# Patient Record
Sex: MALE | Race: WHITE
[De-identification: names, ages, dates, MRNs, and addresses within clinical notes are randomized per-mention and may not be internally consistent; named-entity substitution may affect disease eponyms.]

---

## 2017-09-30 ENCOUNTER — HOSPITAL ENCOUNTER (EMERGENCY)
Dept: HOSPITAL 43 - DL.ED | Age: 52
Discharge: HOME | End: 2017-09-30
Payer: COMMERCIAL

## 2017-09-30 VITALS — DIASTOLIC BLOOD PRESSURE: 76 MMHG | SYSTOLIC BLOOD PRESSURE: 101 MMHG

## 2017-09-30 DIAGNOSIS — E11.9: ICD-10-CM

## 2017-09-30 DIAGNOSIS — Z88.7: ICD-10-CM
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
## 2017-09-30 DIAGNOSIS — Z79.899: ICD-10-CM

## 2017-09-30 DIAGNOSIS — Z88.0: ICD-10-CM

## 2017-09-30 DIAGNOSIS — S86.911A: Primary | ICD-10-CM

## 2017-09-30 DIAGNOSIS — Z79.82: ICD-10-CM
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
## 2017-09-30 DIAGNOSIS — Z79.84: ICD-10-CM

## 2017-09-30 PROCEDURE — 99284 EMERGENCY DEPT VISIT MOD MDM: CPT

## 2017-09-30 PROCEDURE — 73590 X-RAY EXAM OF LOWER LEG: CPT

## 2017-09-30 NOTE — EDM.PDOC
ED HPI GENERAL MEDICAL PROBLEM





- General


Chief Complaint: Lower Extremity Injury/Pain


Stated Complaint: LEG PAIN [id number]


Time Seen by Provider: 09/30/17 11:21


Source of Information: Reports: Patient


History Limitations: Reports: No Limitations





- History of Present Illness


INITIAL COMMENTS - FREE TEXT/NARRATIVE: 





Patient comes emergency department today complaining of right posterior calf 

pain. Just prior to arrival the patient was working outside when a log was 

coming towards him and he jumped backwards landing on his feet and developed 

right posterior calf pain with ambulation. He denies any lung force trauma to 

his right lower extremity. He denies any hip femur knee ankle or right foot 

pain. He does have pain with ambulation in the posterior aspect of his calf. He 

has not taken anything for pain prior to arrival. A sharp shooting stabbing in 

nature. Ice is applied upon arrival.


  ** Right Lower Leg


Pain Score (Numeric/FACES): 4





- Related Data


 Allergies











Allergy/AdvReac Type Severity Reaction Status Date / Time


 


Penicillins Allergy  Cannot Verified 09/30/17 10:27





   Remember  


 


tetanus toxoid, adsorbed Allergy  Cannot Verified 09/30/17 10:27





   Remember  











Home Meds: 


 Home Meds





Aspirin [Ecotrin] 81 mg PO DAILY 03/14/14 [History]


DULoxetine [Cymbalta] 60 mg PO DAILY 03/14/14 [History]


Enalapril [Vasotec] 2.5 mg PO DAILY 03/14/14 [History]


Fish Oil/Omega-3 Fatty Acids [Fish Oil] 1 gm PO BID 03/14/14 [History]


Gabapentin [Gabapentin] 300 mg PO QID 03/14/14 [History]


Simvastatin [Zocor] 40 mg PO BEDTIME 03/14/14 [History]


metFORMIN [metFORMIN XR] 1,000 mg PO BIDM 03/14/14 [History]


traMADol [Ultram] 50 mg PO Q6HR PRN 03/14/14 [History]


Meloxicam [Mobic] 15 mg PO DAILY 10/18/16 [History]


Cyclobenzaprine [Flexeril] 10 mg PO BEDTIME 09/30/17 [History]











Past Medical History


Musculoskeletal History: Reports: Back Pain, Chronic


Endocrine/Metabolic History: Reports: Diabetes, Type II





Social & Family History





- Family History


Family Medical History: Noncontributory





- Tobacco Use


Smoking Status *Q: Never Smoker


Second Hand Smoke Exposure: No





- Caffeine Use


Caffeine Use: Reports: None





- Recreational Drug Use


Recreational Drug Use: No





Review of Systems





- Review of Systems


Review Of Systems: ROS reveals no pertinent complaints other than HPI.





ED EXAM, GENERAL





- Physical Exam


Exam: See Below


Exam Limited By: No Limitations


General Appearance: Alert, WD/WN, No Apparent Distress


Extremities: Normal Inspection, No Pedal Edema, Normal Capillary Refill, Leg 

Pain (Posterior calf tenderness about mid calf. There is no bruising swelling 

and ecchymosis lumps bumps or other abnormalities of the calf. Achilles tendon 

is unremarkable. CMS is intact throughout the entirety of the right lower 

extremity. No bony deformity to the area of the right tib-fib region. He does 

have tenderness in the calf with dorsiflexion and plantar flexion of the right 

foot. Right ankle right knee right femur right hip are atraumatic and 

unremarkable.).  No: Joint Swelling


Neurological: Alert, Oriented, CN II-XII Intact


Psychiatric: Normal Affect, Normal Mood


Skin Exam: Warm, Dry, Intact, Normal Color


Lymphatic: No Adenopathy





Course





- Vital Signs


Last Recorded V/S: 


 Last Vital Signs











Temp  35.9 C   09/30/17 10:37


 


Pulse  83   09/30/17 11:02


 


Resp  16   09/30/17 11:02


 


BP  101/76   09/30/17 11:02


 


Pulse Ox  96   09/30/17 11:02














- Orders/Labs/Meds


Orders: 


 Active Orders 24 hr











 Category Date Time Status


 


 Tibia Fibula Rt [CR] Urgent Exams  09/30/17 11:19 Taken











Meds: 


Medications














Discontinued Medications














Generic Name Dose Route Start Last Admin





  Trade Name Rosemarie  PRN Reason Stop Dose Admin


 


Ibuprofen  600 mg  09/30/17 11:19  09/30/17 11:32





  Motrin  PO  09/30/17 11:20  600 mg





  ONETIME ONE   Administration














- Radiology Interpretation


Free Text/Narrative:: 





NO acute fracture per radiology. 





- Re-Assessments/Exams


Free Text/Narrative Re-Assessment/Exam: 





09/30/17 12:21


I reviewed the negative x-rays with the patient. His pain was somewhat improved 

with the ibuprofen. I feel that this is most likely a muscle strain at this 

time. We did put a 4 inch Ace wrap with almost complete resolution of pain in 

his right calf. We'll treat him conservatively at this time. He was comfortable 

with this plan and his questions were answered.





Departure





- Departure


Time of Disposition: 12:13


Disposition: Home, Self-Care 01


Clinical Impression: 


 Muscle strain








- Discharge Information


Instructions:  Muscle Strain, Easy-to-Read


Forms:  ED Department Discharge


Additional Instructions: 


Tylenol and/or ibuprofen for pain. Ibuprofen will most likely be more effective.


Rest ice compression and elevation of the right injured extremity until symptom-

free. 


Ace wrap as needed for comfort.


May use your Flexeril at home for muscle spasms as well.


Return to the emergency department if new or worsening symptoms.


Follow-up with primary care provider the next 7 days if not improving sooner if 

worse.





- My Orders


Last 24 Hours: 


My Active Orders





09/30/17 11:19


Tibia Fibula Rt [CR] Urgent 














- Assessment/Plan


Last 24 Hours: 


My Active Orders





09/30/17 11:19


Tibia Fibula Rt [CR] Urgent 











Assessment:: 





Left calf muscle strain


Plan: 





Tylenol and/or ibuprofen for pain. Ibuprofen will most likely be more effective.


Rest ice compression and elevation of the right injured extremity until symptom-

free. 


Ace wrap as needed for comfort.


May use your Flexeril at home for muscle spasms as well.


Return to the emergency department if new or worsening symptoms.


Follow-up with primary care provider the next 7 days if not improving sooner if 

worse.

## 2018-04-23 ENCOUNTER — HOSPITAL ENCOUNTER (EMERGENCY)
Dept: HOSPITAL 43 - DL.ED | Age: 53
LOS: 1 days | Discharge: HOME | End: 2018-04-24
Payer: COMMERCIAL

## 2018-04-23 DIAGNOSIS — E86.0: ICD-10-CM

## 2018-04-23 DIAGNOSIS — Z79.82: ICD-10-CM

## 2018-04-23 DIAGNOSIS — Z79.84: ICD-10-CM

## 2018-04-23 DIAGNOSIS — Z88.0: ICD-10-CM

## 2018-04-23 DIAGNOSIS — Z79.899: ICD-10-CM

## 2018-04-23 DIAGNOSIS — I10: ICD-10-CM

## 2018-04-23 DIAGNOSIS — E11.9: ICD-10-CM

## 2018-04-23 DIAGNOSIS — Z88.7: ICD-10-CM

## 2018-04-23 DIAGNOSIS — K52.9: Primary | ICD-10-CM

## 2018-04-23 DIAGNOSIS — E78.00: ICD-10-CM

## 2018-04-23 LAB
CHLORIDE SERPL-SCNC: 101 MMOL/L (ref 101–111)
SODIUM SERPL-SCNC: 133 MMOL/L (ref 135–145)

## 2018-04-23 PROCEDURE — 87804 INFLUENZA ASSAY W/OPTIC: CPT

## 2018-04-23 PROCEDURE — 82150 ASSAY OF AMYLASE: CPT

## 2018-04-23 PROCEDURE — 81001 URINALYSIS AUTO W/SCOPE: CPT

## 2018-04-23 PROCEDURE — 96361 HYDRATE IV INFUSION ADD-ON: CPT

## 2018-04-23 PROCEDURE — 96374 THER/PROPH/DIAG INJ IV PUSH: CPT

## 2018-04-23 PROCEDURE — 87899 AGENT NOS ASSAY W/OPTIC: CPT

## 2018-04-23 PROCEDURE — 83605 ASSAY OF LACTIC ACID: CPT

## 2018-04-23 PROCEDURE — 85025 COMPLETE CBC W/AUTO DIFF WBC: CPT

## 2018-04-23 PROCEDURE — 99284 EMERGENCY DEPT VISIT MOD MDM: CPT

## 2018-04-23 PROCEDURE — 80053 COMPREHEN METABOLIC PANEL: CPT

## 2018-04-23 PROCEDURE — 83690 ASSAY OF LIPASE: CPT

## 2018-04-23 PROCEDURE — 87040 BLOOD CULTURE FOR BACTERIA: CPT

## 2018-04-23 PROCEDURE — 36415 COLL VENOUS BLD VENIPUNCTURE: CPT

## 2018-04-23 PROCEDURE — 87046 STOOL CULTR AEROBIC BACT EA: CPT

## 2018-04-23 PROCEDURE — 96375 TX/PRO/DX INJ NEW DRUG ADDON: CPT

## 2018-04-23 PROCEDURE — 87045 FECES CULTURE AEROBIC BACT: CPT

## 2018-04-23 NOTE — EDM.PDOC
ED HPI GENERAL MEDICAL PROBLEM





- General


Chief Complaint: Gastrointestinal Problem


Stated Complaint: [id number] FLU?


Time Seen by Provider: 04/23/18 19:30


Source of Information: Reports: Patient


History Limitations: Reports: No Limitations





- History of Present Illness


INITIAL COMMENTS - FREE TEXT/NARRATIVE: 





diarrhea since last april, 20 plus stools today. dizzy when up


  ** Abdomen


Pain Score (Numeric/FACES): 8





- Related Data


 Allergies











Allergy/AdvReac Type Severity Reaction Status Date / Time


 


Penicillins Allergy  Cannot Verified 09/30/17 10:27





   Remember  


 


tetanus toxoid, adsorbed Allergy  Cannot Verified 09/30/17 10:27





   Remember  











Home Meds: 


 Home Meds





Aspirin [Ecotrin] 81 mg PO DAILY 03/14/14 [History]


DULoxetine [Cymbalta] 60 mg PO DAILY 03/14/14 [History]


Enalapril [Vasotec] 2.5 mg PO DAILY 03/14/14 [History]


Fish Oil/Omega-3 Fatty Acids [Fish Oil] 1 gm PO BID 03/14/14 [History]


Gabapentin 300 mg PO QID 03/14/14 [History]


Simvastatin [Zocor] 40 mg PO BEDTIME 03/14/14 [History]


metFORMIN [metFORMIN XR] 1,000 mg PO BIDM 03/14/14 [History]


traMADol [Ultram] 50 mg PO Q6HR PRN 03/14/14 [History]


Meloxicam [Mobic] 15 mg PO DAILY 10/18/16 [History]


Cyclobenzaprine [Flexeril] 10 mg PO BEDTIME 09/30/17 [History]











Past Medical History


Cardiovascular History: Reports: High Cholesterol, Hypertension


Musculoskeletal History: Reports: Back Pain, Chronic


Psychiatric History: Reports: Anxiety, Depression


Endocrine/Metabolic History: Reports: Diabetes, Type II





Social & Family History





- Family History


Family Medical History: Noncontributory





- Tobacco Use


Smoking Status *Q: Never Smoker


Second Hand Smoke Exposure: No





- Caffeine Use


Caffeine Use: Reports: Soda





- Recreational Drug Use


Recreational Drug Use: No





ED ROS GENERAL





- Review of Systems


Review Of Systems: See Below


Constitutional: Reports: Fever, Chills, Malaise, Weakness, Decreased Appetite


HEENT: Reports: No Symptoms


Respiratory: Reports: No Symptoms


Cardiovascular: Reports: Lightheadedness


GI/Abdominal: Reports: Abdominal Pain, Diarrhea, Decreased Appetite


Musculoskeletal: Reports: No Symptoms


Skin: Reports: No Symptoms


Neurological: Reports: Headache





ED EXAM, GI/ABD





- Physical Exam


Exam: See Below


Exam Limited By: No Limitations


General Appearance: Alert, No Apparent Distress


Eyes: Bilateral: EOMI


Ears: Normal External Exam


Nose: Normal Inspection


Throat/Mouth: Normal Lips, Other (membranes parched)


Head: Atraumatic, Normocephalic


Neck: Normal Inspection


Respiratory/Chest: No Respiratory Distress, Lungs Clear, Normal Breath Sounds


Cardiovascular: Normal Peripheral Pulses, Regular Rate, Rhythm


GI/Abdominal Exam: Normal Bowel Sounds, Soft, Tender, Abnormal Bowel Sounds (

hyperactive)


Back Exam: Normal Inspection


Extremities: Normal Inspection


Neurological: Alert, Oriented, Normal Cognition


Psychiatric: Normal Affect, Normal Mood


Skin Exam: Warm, Dry, Intact, Normal Color





Course





- Vital Signs


Last Recorded V/S: 


 Last Vital Signs











Temp  101.0 F H  04/24/18 01:36


 


Pulse  98   04/24/18 01:36


 


Resp  19   04/24/18 01:36


 


BP  112/71   04/24/18 01:36


 


Pulse Ox  97   04/24/18 01:36














- Orders/Labs/Meds


Orders: 


 Active Orders 24 hr











 Category Date Time Status


 


 CULTURE BLOOD [BC] Stat Lab  04/23/18 20:30 Received


 


 CULTURE STOOL [RM] Stat Lab  04/24/18 00:27 Ordered


 


 SHIGA TOXIN 1 & 2 [MREF] Stat Lab  04/24/18 00:27 Received











Labs: 


 Laboratory Tests











  04/23/18 04/23/18 04/23/18 Range/Units





  20:30 20:30 20:30 


 


WBC  12.7 H    (5.0-10.0)  10^3/uL


 


RBC  4.14 L    (4.6-6.2)  10^6/uL


 


Hgb  14.2    (14.0-18.0)  g/dL


 


Hct  40.6    (40.0-54.0)  %


 


MCV  98.1    ()  fL


 


MCH  34.3 H    (27.0-34.0)  pg


 


MCHC  35.0    (33.0-35.0)  g/dL


 


Plt Count  156    (150-450)  10^3/uL


 


Neut % (Auto)  89.8 H    (42.2-75.2)  %


 


Lymph % (Auto)  2.7 L    (20.5-50.1)  %


 


Mono % (Auto)  7.2    (2-8)  %


 


Eos % (Auto)  0.1 L    (1.0-3.0)  %


 


Baso % (Auto)  0.2    (0.0-1.0)  %


 


Sodium   133 L   (135-145)  mmol/L


 


Potassium   3.4 L   (3.6-5.0)  mmol/L


 


Chloride   101   (101-111)  mmol/L


 


Carbon Dioxide   21.0   (21.0-31.0)  mmol/L


 


Anion Gap   14.4   


 


BUN   14   (7-18)  mg/dL


 


Creatinine   0.9   (0.6-1.3)  mg/dL


 


Est Cr Clr Drug Dosing   99.94   mL/min


 


Estimated GFR (MDRD)   > 60   


 


BUN/Creatinine Ratio   15.55   


 


Glucose   193 H   ()  mg/dL


 


Lactic Acid    2.5 H  (0.5-2.2)  mmol/L


 


Calcium   9.0   (8.4-10.2)  mg/dl


 


Total Bilirubin   2.8 H   (0.2-1.0)  mg/dL


 


AST   38   (10-42)  IU/L


 


ALT   35   (10-60)  IU/L


 


Alkaline Phosphatase   68   ()  IU/L


 


Total Protein   7.6   (6.7-8.2)  g/dl


 


Albumin   4.5   (3.2-5.5)  g/dl


 


Globulin   3.1   


 


Albumin/Globulin Ratio   1.45   


 


Amylase   23 L   ()  U/L


 


Lipase   24   (22-51)  U/L


 


Urine Color     (YELLOW)  


 


Urine Appearance     (CLEAR)  


 


Urine pH     (5.0-9.0)  


 


Ur Specific Gravity     (1.005-1.030)  


 


Urine Protein     (NEGATIVE)  


 


Urine Glucose (UA)     (NEGATIVE)  


 


Urine Ketones     (NEGATIVE)  


 


Urine Occult Blood     (NEGATIVE)  


 


Urine Nitrite     (NEGATIVE)  


 


Urine Bilirubin     (NEGATIVE)  


 


Urine Urobilinogen     (0.2-1.0)  mg/dL


 


Ur Leukocyte Esterase     (NEGATIVE)  


 


Urine RBC     /HPF


 


Urine WBC     (0-5/HPF)  /HPF


 


Ur Epithelial Cells     /HPF


 


Urine Bacteria     (0-FEW/HPF)  /HPF


 


Urine Mucus     /LPF














  04/24/18 Range/Units





  00:27 


 


WBC   (5.0-10.0)  10^3/uL


 


RBC   (4.6-6.2)  10^6/uL


 


Hgb   (14.0-18.0)  g/dL


 


Hct   (40.0-54.0)  %


 


MCV   ()  fL


 


MCH   (27.0-34.0)  pg


 


MCHC   (33.0-35.0)  g/dL


 


Plt Count   (150-450)  10^3/uL


 


Neut % (Auto)   (42.2-75.2)  %


 


Lymph % (Auto)   (20.5-50.1)  %


 


Mono % (Auto)   (2-8)  %


 


Eos % (Auto)   (1.0-3.0)  %


 


Baso % (Auto)   (0.0-1.0)  %


 


Sodium   (135-145)  mmol/L


 


Potassium   (3.6-5.0)  mmol/L


 


Chloride   (101-111)  mmol/L


 


Carbon Dioxide   (21.0-31.0)  mmol/L


 


Anion Gap   


 


BUN   (7-18)  mg/dL


 


Creatinine   (0.6-1.3)  mg/dL


 


Est Cr Clr Drug Dosing   mL/min


 


Estimated GFR (MDRD)   


 


BUN/Creatinine Ratio   


 


Glucose   ()  mg/dL


 


Lactic Acid   (0.5-2.2)  mmol/L


 


Calcium   (8.4-10.2)  mg/dl


 


Total Bilirubin   (0.2-1.0)  mg/dL


 


AST   (10-42)  IU/L


 


ALT   (10-60)  IU/L


 


Alkaline Phosphatase   ()  IU/L


 


Total Protein   (6.7-8.2)  g/dl


 


Albumin   (3.2-5.5)  g/dl


 


Globulin   


 


Albumin/Globulin Ratio   


 


Amylase   ()  U/L


 


Lipase   (22-51)  U/L


 


Urine Color  Yellow  (YELLOW)  


 


Urine Appearance  Clear  (CLEAR)  


 


Urine pH  5.0  (5.0-9.0)  


 


Ur Specific Gravity  >= 1.030  (1.005-1.030)  


 


Urine Protein  Trace H  (NEGATIVE)  


 


Urine Glucose (UA)  Negative  (NEGATIVE)  


 


Urine Ketones  15 H  (NEGATIVE)  


 


Urine Occult Blood  Negative  (NEGATIVE)  


 


Urine Nitrite  Negative  (NEGATIVE)  


 


Urine Bilirubin  Negative  (NEGATIVE)  


 


Urine Urobilinogen  0.2  (0.2-1.0)  mg/dL


 


Ur Leukocyte Esterase  Negative  (NEGATIVE)  


 


Urine RBC  0-5  /HPF


 


Urine WBC  0-5  (0-5/HPF)  /HPF


 


Ur Epithelial Cells  Occasional  /HPF


 


Urine Bacteria  Few  (0-FEW/HPF)  /HPF


 


Urine Mucus  Moderate H  /LPF











Meds: 


Medications














Discontinued Medications














Generic Name Dose Route Start Last Admin





  Trade Name Rosemarie  PRN Reason Stop Dose Admin


 


Acetaminophen  650 mg  04/23/18 21:43  04/23/18 21:53





  Tylenol  PO  04/23/18 21:44  650 mg





  NOW ONE   Administration





     





     





     





     


 


Famotidine  20 mg  04/23/18 20:57  04/23/18 21:06





  Pepcid  IVPUSH  04/23/18 20:58  20 mg





  ONETIME ONE   Administration





     





     





     





     


 


Sodium Chloride  1,000 mls @ 999 mls/hr  04/23/18 20:45  04/23/18 20:35





  Normal Saline  IV   999 mls/hr





  ASDIRECTED REECE   Administration





     





     





     





     


 


Sodium Chloride  1,000 mls @ 999 mls/hr  04/23/18 21:53  04/23/18 21:57





  Normal Saline  IV  04/23/18 22:53  999 mls/hr





  .BOLUS ONE   Administration





     





     





     





     


 


Ibuprofen  600 mg  04/23/18 23:29  04/23/18 23:36





  Motrin  PO  04/23/18 23:30  600 mg





  ONETIME ONE   Administration





     





     





     





     


 


Ondansetron HCl  4 mg  04/23/18 21:05  04/23/18 21:08





  Zofran  IV  04/23/18 21:06  4 mg





  ONETIME ONE   Administration





     





     





     





     


 


Ondansetron HCl  Confirm  04/24/18 01:42  04/24/18 01:48





  Zofran Odt  Administered  04/24/18 01:43  Not Given





  Dose   





  8 mg   





  .ROUTE   





  .STK-MED ONE   





     





     





     





     














- Re-Assessments/Exams


Free Text/Narrative Re-Assessment/Exam: 





04/24/18 03:00


Fever controlled with use of tylenol and ibuprofen.Abodominal pain improved, 

headache improved. One stool during ED. tolerating few sips liquid. 





Departure





- Departure


Time of Disposition: 00:55


Disposition: Home, Self-Care 01


Condition: Good


Clinical Impression: 


 Gastroenteritis, Diarrhea, Dehydration, mild








- Discharge Information


Instructions:  Diarrhea, Adult, Dehydration, Adult, Easy-to-Read


Forms:  ED Department Discharge


Additional Instructions: 


clear liquid diet small amounts more frequent, slowly advance as tolerated


zofran 4mg ODT one every 6 hours as needed for nausea #2





follow up if symptoms worsen





- My Orders


Last 24 Hours: 


My Active Orders





04/23/18 20:30


CULTURE BLOOD [BC] Stat 





04/24/18 00:27


CULTURE STOOL [RM] Stat 


SHIGA TOXIN 1 & 2 [MREF] Stat 














- Assessment/Plan


Last 24 Hours: 


My Active Orders





04/23/18 20:30


CULTURE BLOOD [BC] Stat 





04/24/18 00:27


CULTURE STOOL [RM] Stat 


SHIGA TOXIN 1 & 2 [MREF] Stat

## 2018-04-24 VITALS — SYSTOLIC BLOOD PRESSURE: 112 MMHG | DIASTOLIC BLOOD PRESSURE: 71 MMHG

## 2019-06-06 ENCOUNTER — HOSPITAL ENCOUNTER (OUTPATIENT)
Dept: HOSPITAL 43 - DL.MRI | Age: 54
End: 2019-06-06
Attending: ORTHOPAEDIC SURGERY
Payer: COMMERCIAL

## 2019-06-06 DIAGNOSIS — M75.92: ICD-10-CM

## 2019-06-06 DIAGNOSIS — M25.512: Primary | ICD-10-CM

## 2019-06-06 DIAGNOSIS — G89.29: ICD-10-CM

## 2019-06-06 DIAGNOSIS — M19.012: ICD-10-CM

## 2019-06-06 DIAGNOSIS — M75.122: ICD-10-CM

## 2022-02-13 ENCOUNTER — HOSPITAL ENCOUNTER (EMERGENCY)
Dept: HOSPITAL 43 - DL.ED | Age: 57
Discharge: HOME | End: 2022-02-13
Payer: COMMERCIAL

## 2022-02-13 VITALS — HEART RATE: 86 BPM | SYSTOLIC BLOOD PRESSURE: 142 MMHG | DIASTOLIC BLOOD PRESSURE: 117 MMHG

## 2022-02-13 DIAGNOSIS — Z20.822: ICD-10-CM

## 2022-02-13 DIAGNOSIS — Z79.899: ICD-10-CM

## 2022-02-13 DIAGNOSIS — R00.2: Primary | ICD-10-CM

## 2022-02-13 DIAGNOSIS — Z79.82: ICD-10-CM

## 2022-02-13 DIAGNOSIS — Z88.0: ICD-10-CM

## 2022-02-13 DIAGNOSIS — I10: ICD-10-CM

## 2022-02-13 DIAGNOSIS — Z88.7: ICD-10-CM

## 2022-02-13 DIAGNOSIS — E78.00: ICD-10-CM

## 2022-02-13 DIAGNOSIS — E11.9: ICD-10-CM

## 2022-02-13 LAB
AMPHET UR QL SCN: NEGATIVE
AMPHET UR QL SCN: NEGATIVE
AMPHETAMINES UR QL SCN>500 NG/ML: NEGATIVE
ANION GAP SERPL CALC-SCNC: 17.7 MEQ/L (ref 7–13)
BARBITURATES UR QL SCN: NEGATIVE
CHLORIDE SERPL-SCNC: 106 MMOL/L (ref 98–107)
MDMA UR QL SCN: NEGATIVE
OXYCODONE UR QL SCN: NEGATIVE
PCP UR QL SCN: NEGATIVE
RSV RNA UPPER RESP QL NAA+PROBE: NEGATIVE
SARS-COV-2 RNA RESP QL NAA+PROBE: NEGATIVE
SODIUM SERPL-SCNC: 141 MMOL/L (ref 136–145)
SP GR UR STRIP: 1.02 (ref 1–1.03)
TRICYCLICS UR QL SCN: NEGATIVE

## 2024-09-20 ENCOUNTER — HOSPITAL ENCOUNTER (OUTPATIENT)
Dept: HOSPITAL 43 - DL.ED | Age: 59
Setting detail: OBSERVATION
LOS: 1 days | Discharge: HOME | End: 2024-09-21
Attending: INTERNAL MEDICINE | Admitting: INTERNAL MEDICINE
Payer: COMMERCIAL

## 2024-09-20 DIAGNOSIS — F41.8: ICD-10-CM

## 2024-09-20 DIAGNOSIS — Z98.890: ICD-10-CM

## 2024-09-20 DIAGNOSIS — R55: Primary | ICD-10-CM

## 2024-09-20 DIAGNOSIS — E66.9: ICD-10-CM

## 2024-09-20 DIAGNOSIS — Z88.0: ICD-10-CM

## 2024-09-20 DIAGNOSIS — D72.829: ICD-10-CM

## 2024-09-20 DIAGNOSIS — Z79.84: ICD-10-CM

## 2024-09-20 DIAGNOSIS — F10.129: ICD-10-CM

## 2024-09-20 DIAGNOSIS — Z79.899: ICD-10-CM

## 2024-09-20 DIAGNOSIS — R29.6: ICD-10-CM
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
## 2024-09-20 DIAGNOSIS — E11.65: ICD-10-CM

## 2024-09-20 DIAGNOSIS — I10: ICD-10-CM

## 2024-09-20 DIAGNOSIS — E86.0: ICD-10-CM

## 2024-09-20 DIAGNOSIS — E86.9: ICD-10-CM

## 2024-09-20 DIAGNOSIS — Z79.82: ICD-10-CM

## 2024-09-20 DIAGNOSIS — E87.29: ICD-10-CM

## 2024-09-20 DIAGNOSIS — E78.00: ICD-10-CM

## 2024-09-20 DIAGNOSIS — R74.02: ICD-10-CM

## 2024-09-20 DIAGNOSIS — E80.6: ICD-10-CM

## 2024-09-20 DIAGNOSIS — R53.1: ICD-10-CM

## 2024-09-20 DIAGNOSIS — Z88.7: ICD-10-CM

## 2024-09-20 LAB
ALBUMIN SERPL-MCNC: 3.9 G/DL (ref 3.4–5)
ALBUMIN/GLOB SERPL: 1 {RATIO}
ALP SERPL-CCNC: 119 U/L (ref 46–116)
ALT SERPL-CCNC: 22 U/L (ref 16–63)
AMPHET UR QL SCN: NEGATIVE
AMPHET UR QL SCN: NEGATIVE
AMPHETAMINES UR QL SCN>500 NG/ML: NEGATIVE
ANION GAP SERPL CALC-SCNC: 17.8 MEQ/L (ref 7–13)
APPEARANCE UR: CLEAR
AST SERPL-CCNC: 13 U/L (ref 15–37)
BARBITURATES UR QL SCN: NEGATIVE
BASOPHILS NFR BLD AUTO: 0.2 % (ref 0–1)
BILIRUB SERPL-MCNC: 1.2 MG/DL (ref 0.2–1)
BILIRUB UR STRIP-MCNC: NEGATIVE MG/DL
BUN SERPL-MCNC: 18 MG/DL (ref 7–18)
BUN/CREAT SERPL: 18.4
CALCIUM SERPL-MCNC: 9.5 MG/DL (ref 8.5–10.1)
CHLORIDE SERPL-SCNC: 103 MMOL/L (ref 98–107)
CK SERPL-CCNC: 88 U/L (ref 39–308)
CO2 SERPL-SCNC: 24 MMOL/L (ref 21–32)
COLOR UR: YELLOW
CREAT CL 24H UR+SERPL-VRATE: 81.73 ML/MIN
CREAT SERPL-MCNC: 0.98 MG/DL (ref 0.7–1.3)
CRP SERPL-MCNC: < 0.5 NG/DL (ref ?–0.5)
EGFRCR SERPLBLD CKD-EPI 2021: 88 ML/MIN (ref 60–?)
EOSINOPHIL NFR BLD AUTO: 0.8 % (ref 1–3)
ETHANOL BLD-MCNC: 20 MG/DL
GLOBULIN SER-MCNC: 3.8 G/DL
GLUCOSE SERPL-MCNC: 136 MG/DL (ref 70–99)
GLUCOSE UR STRIP-MCNC: NEGATIVE MG/DL
HCT VFR BLD AUTO: 40.1 % (ref 40–54)
HGB BLD-MCNC: 13.3 G/DL (ref 14–18)
KETONES UR STRIP-MCNC: 15 MG/DL
LACTATE SERPL-SCNC: 3.4 MMOL/L (ref 0.4–2)
LACTATE SERPL-SCNC: 5.4 MMOL/L (ref 0.4–2)
LYMPHOCYTES NFR BLD AUTO: 24.1 % (ref 20.5–50.1)
MAGNESIUM SERPL-MCNC: 1.8 MG/DL (ref 1.8–2.4)
MCH RBC QN AUTO: 32.8 PG (ref 27–34)
MCHC RBC AUTO-ENTMCNC: 33.2 G/DL (ref 33–35)
MCHC RBC AUTO-ENTMCNC: 99 FL (ref 80–100)
MDMA UR QL SCN: NEGATIVE
MONOCYTES NFR BLD AUTO: 7.5 % (ref 2–8)
NEUTROPHILS NFR BLD AUTO: 67.4 % (ref 42.2–75.2)
NITRITE UR QL: NEGATIVE
OXYCODONE UR QL SCN: NEGATIVE
PCP UR QL SCN: NEGATIVE
PH UR STRIP: 5 [PH] (ref 5–9)
PLATELET # BLD AUTO: 320 10^3/UL (ref 150–450)
POTASSIUM SERPL-SCNC: 3.8 MMOL/L (ref 3.5–5.1)
PROT SERPL-MCNC: 7.7 G/DL (ref 6.4–8.2)
PROT UR STRIP-MCNC: NEGATIVE MG/DL
RBC # BLD AUTO: 4.05 10^6/UL (ref 4.6–6.2)
RBC UR QL: NEGATIVE
SODIUM SERPL-SCNC: 141 MMOL/L (ref 136–145)
SP GR UR STRIP: >= 1.03 (ref 1–1.03)
T4 FREE SERPL-MCNC: 0.86 NG/DL (ref 0.76–1.46)
TRICYCLICS UR QL SCN: NEGATIVE
TSH SERPL DL<=0.005 MIU/L-ACNC: 1.71 UIU/ML (ref 0.36–3.74)
UROBILINOGEN UR STRIP-ACNC: 0.2 MG/DL (ref 0.2–1)
WBC # BLD AUTO: 11.3 10^3/UL (ref 5–10)

## 2024-09-20 PROCEDURE — G0378 HOSPITAL OBSERVATION PER HR: HCPCS

## 2024-09-21 VITALS — SYSTOLIC BLOOD PRESSURE: 110 MMHG | DIASTOLIC BLOOD PRESSURE: 71 MMHG | HEART RATE: 74 BPM

## 2024-09-21 LAB
ALBUMIN SERPL-MCNC: 3.1 G/DL (ref 3.4–5)
ALBUMIN/GLOB SERPL: 0.97 {RATIO}
ALP SERPL-CCNC: 92 U/L (ref 46–116)
ALT SERPL-CCNC: 18 U/L (ref 16–63)
ANION GAP SERPL CALC-SCNC: 10.9 MEQ/L (ref 7–13)
AST SERPL-CCNC: 13 U/L (ref 15–37)
BASOPHILS NFR BLD AUTO: 0.3 % (ref 0–1)
BILIRUB SERPL-MCNC: 1.3 MG/DL (ref 0.2–1)
BUN SERPL-MCNC: 8 MG/DL (ref 7–18)
BUN/CREAT SERPL: 10.5
CALCIUM SERPL-MCNC: 9 MG/DL (ref 8.5–10.1)
CHLORIDE SERPL-SCNC: 109 MMOL/L (ref 98–107)
CK SERPL-CCNC: 85 U/L (ref 39–308)
CO2 SERPL-SCNC: 27 MMOL/L (ref 21–32)
CREAT CL 24H UR+SERPL-VRATE: 105.39 ML/MIN
CREAT SERPL-MCNC: 0.76 MG/DL (ref 0.7–1.3)
EGFRCR SERPLBLD CKD-EPI 2021: 102 ML/MIN (ref 60–?)
EOSINOPHIL NFR BLD AUTO: 1.9 % (ref 1–3)
GLOBULIN SER-MCNC: 3.2 G/DL
GLUCOSE SERPL-MCNC: 102 MG/DL (ref 70–99)
HCT VFR BLD AUTO: 34.5 % (ref 40–54)
HGB BLD-MCNC: 11.3 G/DL (ref 14–18)
LYMPHOCYTES NFR BLD AUTO: 26.3 % (ref 20.5–50.1)
MAGNESIUM SERPL-MCNC: 2 MG/DL (ref 1.8–2.4)
MCH RBC QN AUTO: 32.7 PG (ref 27–34)
MCHC RBC AUTO-ENTMCNC: 32.8 G/DL (ref 33–35)
MCHC RBC AUTO-ENTMCNC: 99.7 FL (ref 80–100)
MONOCYTES NFR BLD AUTO: 10.1 % (ref 2–8)
NEUTROPHILS NFR BLD AUTO: 61.4 % (ref 42.2–75.2)
PLATELET # BLD AUTO: 224 10^3/UL (ref 150–450)
POTASSIUM SERPL-SCNC: 3.9 MMOL/L (ref 3.5–5.1)
PROT SERPL-MCNC: 6.3 G/DL (ref 6.4–8.2)
RBC # BLD AUTO: 3.46 10^6/UL (ref 4.6–6.2)
SODIUM SERPL-SCNC: 143 MMOL/L (ref 136–145)
WBC # BLD AUTO: 5.7 10^3/UL (ref 5–10)